# Patient Record
Sex: MALE | Race: WHITE | NOT HISPANIC OR LATINO | Employment: FULL TIME | URBAN - METROPOLITAN AREA
[De-identification: names, ages, dates, MRNs, and addresses within clinical notes are randomized per-mention and may not be internally consistent; named-entity substitution may affect disease eponyms.]

---

## 2021-03-22 ENCOUNTER — APPOINTMENT (OUTPATIENT)
Dept: GENERAL RADIOLOGY | Facility: HOSPITAL | Age: 21
End: 2021-03-22

## 2021-03-22 ENCOUNTER — HOSPITAL ENCOUNTER (EMERGENCY)
Facility: HOSPITAL | Age: 21
Discharge: HOME | End: 2021-03-22
Admitting: EMERGENCY MEDICINE

## 2021-03-22 VITALS
HEIGHT: 68 IN | OXYGEN SATURATION: 98 % | HEART RATE: 69 BPM | WEIGHT: 208 LBS | RESPIRATION RATE: 17 BRPM | SYSTOLIC BLOOD PRESSURE: 138 MMHG | DIASTOLIC BLOOD PRESSURE: 62 MMHG | TEMPERATURE: 97.5 F | BODY MASS INDEX: 31.52 KG/M2

## 2021-03-22 DIAGNOSIS — S61.011A LACERATION OF RIGHT THUMB WITHOUT FOREIGN BODY WITHOUT DAMAGE TO NAIL, INITIAL ENCOUNTER: Primary | ICD-10-CM

## 2021-03-22 DIAGNOSIS — S62.521B OPEN FRACTURE OF TUFT OF DISTAL PHALANX OF RIGHT THUMB: ICD-10-CM

## 2021-03-22 PROCEDURE — 73140 X-RAY EXAM OF FINGER(S): CPT

## 2021-03-22 PROCEDURE — 96372 THER/PROPH/DIAG INJ SC/IM: CPT

## 2021-03-22 PROCEDURE — 99282 EMERGENCY DEPT VISIT SF MDM: CPT

## 2021-03-22 PROCEDURE — 25010000003 CEFAZOLIN PER 500 MG: Performed by: NURSE PRACTITIONER

## 2021-03-22 RX ORDER — CEPHALEXIN 500 MG/1
500 CAPSULE ORAL 3 TIMES DAILY
Qty: 21 CAPSULE | Refills: 0 | Status: SHIPPED | OUTPATIENT
Start: 2021-03-22 | End: 2021-03-29

## 2021-03-22 RX ORDER — WATER 1000 ML/1000ML
INJECTION, SOLUTION INTRAVENOUS
Status: COMPLETED
Start: 2021-03-22 | End: 2021-03-22

## 2021-03-22 RX ORDER — HYDROCODONE BITARTRATE AND ACETAMINOPHEN 7.5; 325 MG/1; MG/1
1 TABLET ORAL EVERY 4 HOURS PRN
Qty: 12 TABLET | Refills: 0 | Status: SHIPPED | OUTPATIENT
Start: 2021-03-22

## 2021-03-22 RX ORDER — CEFAZOLIN SODIUM 1 G/3ML
1 INJECTION, POWDER, FOR SOLUTION INTRAMUSCULAR; INTRAVENOUS ONCE
Status: COMPLETED | OUTPATIENT
Start: 2021-03-22 | End: 2021-03-22

## 2021-03-22 RX ADMIN — CEFAZOLIN 1 G: 1 INJECTION, POWDER, FOR SOLUTION INTRAMUSCULAR; INTRAVENOUS at 11:19

## 2021-03-22 RX ADMIN — WATER 10 ML: 1 INJECTION INTRAMUSCULAR; INTRAVENOUS; SUBCUTANEOUS at 11:20

## 2021-03-22 NOTE — DISCHARGE INSTRUCTIONS
Keep wound clean dry and covered.  Wear finger guard.  Take antibiotics as prescribed.  Follow-up with the hand specialist this week.  Call them today for an appointment.  No use of the right hand until follow-up with hand specialist.  Return for new or worsening symptoms.

## 2021-03-22 NOTE — ED PROVIDER NOTES
Subjective   Patient is a 20-year-old white male with no significant medical history presents today with complaints of an injury to his right thumb.  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  Complains of pain and open wound to the right thumb.  He denies any other injury or complaint.  He states he is up-to-date on his tetanus.          Review of Systems   Skin: Positive for wound.        Open wound right thumb       No past medical history on file.    No Known Allergies    No past surgical history on file.    No family history on file.    Social History     Socioeconomic History   • Marital status: Single     Spouse name: Not on file   • Number of children: Not on file   • Years of education: Not on file   • Highest education level: Not on file           Objective   Physical Exam  Vital signs and triage nurse note reviewed.  Constitutional: Awake, alert; well-developed and well-nourished. No acute distress is noted.  Cardiovascular: Regular rate and rhythm  Pulmonary: Respiratory effort regular nonlabored  Musculoskeletal: Independent range of motion of all extremities with no palpable tenderness or edema.  Neuro: Alert oriented x3, speech is clear and appropriate, GCS 15.    Skin: Flesh tone, warm, dry.  There is an open wound with flap of tissue noted over the tip of the right thumb that extends a bit to the radial aspect.  It extends to the nail but does not involve the nail.  The nail is intact.  There is small amount of active bleeding that is controlled with direct pressure.      Laceration Repair    Date/Time: 3/22/2021 11:17 AM  Performed by: Shelby Cunningham APRN  Authorized by: Shelby Cunningham APRN     Consent:     Consent obtained:  Verbal    Consent given by:  Patient    Risks discussed:  Infection, pain, retained foreign body, need for additional repair and poor wound healing  Anesthesia (see MAR for exact  dosages):     Anesthesia method:  Nerve block    Block location:  Right thumb    Block needle gauge:  30 G    Block anesthetic:  Bupivacaine 0.5% w/o epi and lidocaine 2% w/o epi    Block injection procedure:  Anatomic landmarks identified, anatomic landmarks palpated, introduced needle, negative aspiration for blood and incremental injection    Block outcome:  Anesthesia achieved  Laceration details:     Location:  Finger    Finger location:  R thumb    Length (cm):  2  Repair type:     Repair type:  Simple  Pre-procedure details:     Preparation:  Patient was prepped and draped in usual sterile fashion and imaging obtained to evaluate for foreign bodies  Exploration:     Wound exploration: wound explored through full range of motion and entire depth of wound probed and visualized      Wound extent: no tendon damage noted      Contaminated: yes    Treatment:     Area cleansed with:  Hibiclens and saline    Amount of cleaning:  Extensive    Irrigation solution:  Sterile saline  Skin repair:     Repair method:  Sutures    Suture size:  5-0    Suture material:  Nylon    Suture technique:  Simple interrupted    Number of sutures:  8  Approximation:     Approximation:  Close  Post-procedure details:     Dressing:  Antibiotic ointment, non-adherent dressing and splint for protection    Patient tolerance of procedure:  Tolerated well, no immediate complications               ED Course      Labs Reviewed - No data to display  XR Finger 2+ View Right    Result Date: 3/22/2021  Mildly displaced fracture related to amputation of the distal tip of the first distal phalanx with surrounding soft tissue swelling and multiple tiny bone fragments.  Electronically Signed By-Adry Newman MD On:3/22/2021 10:36 AM This report was finalized on [id number] by  Adry Newman MD.    Medications   ceFAZolin (ANCEF) injection 1 g (has no administration in time range)   sterile water (preservative free) injection  - ADS Override Pull (has  no administration in time range)                                          MDM  Number of Diagnoses or Management Options  Laceration of right thumb without foreign body without damage to nail, initial encounter  Open fracture of tuft of distal phalanx of right thumb  Diagnosis management comments: Comorbidities: None  Differentials: Fracture, laceration;this list is not all inclusive and does not constitute the entirety of considered causes  Discussion with provider:  Radiology interpretation: X-rays reviewed by me and interpreted by radiologist: As above  Lab interpretation: Labs viewed by me significant for:     Patient had x-rays obtained of the right thumb that shows mildly displaced fracture related to amputation of the distal tip of the 1st distal phalanx with surrounding soft tissue swelling and multiple tiny bone fragments.  Patient states he is up-to-date on his tetanus booster.    He was given 1 gram of Kefzol IM.  He had his wound cleaned and irrigated thoroughly and repaired as noted above.    Diagnosis and treatment plan discussed with patient.  Patient agreeable to plan.   I discussed findings with patient who voices understanding of discharge instructions, signs and symptoms requiring return to ED; discharged improved and in stable condition with follow up for re-evaluation.  Prescription for Keflex and Norco.  Inspect reviewed.       Amount and/or Complexity of Data Reviewed  Tests in the radiology section of CPT®: reviewed and ordered    Patient Progress  Patient progress: stable      Final diagnoses:   Laceration of right thumb without foreign body without damage to nail, initial encounter   Open fracture of tuft of distal phalanx of right thumb       ED Disposition  ED Disposition     ED Disposition Condition Comment    Discharge Stable           Blair Pizarro MD  [address] [facility] [address]  [place] [address]  [phone]    Schedule an appointment as soon as possible for a visit        KLEINERT KUTZ [address]  [facility] [address] [phone]  Schedule an appointment as soon as possible for a visit            Medication List      New Prescriptions    cephalexin 500 MG capsule  Commonly known as: KEFLEX  Take 1 capsule by mouth 3 (Three) Times a Day for 7 days.     HYDROcodone-acetaminophen 7.5-325 MG per tablet  Commonly known as: NORCO  Take 1 tablet by mouth Every 4 (Four) Hours As Needed for Moderate Pain .           Where to Get Your Medications      These medications were sent to [id number] - [place], [place] - [address] AT SEC [address] - [phone]  - [phone]   [address] [place] [address]    Phone: [phone]   · cephalexin 500 MG capsule  · HYDROcodone-acetaminophen 7.5-325 MG per tablet          Shelby Cunningham, DEANNE  03/22/21 9203

## 2021-03-23 ENCOUNTER — EPISODE CHANGES (OUTPATIENT)
Dept: CASE MANAGEMENT | Facility: OTHER | Age: 21
End: 2021-03-23